# Patient Record
Sex: FEMALE | Race: BLACK OR AFRICAN AMERICAN | NOT HISPANIC OR LATINO | URBAN - METROPOLITAN AREA
[De-identification: names, ages, dates, MRNs, and addresses within clinical notes are randomized per-mention and may not be internally consistent; named-entity substitution may affect disease eponyms.]

---

## 2021-05-20 ENCOUNTER — EMERGENCY (EMERGENCY)
Facility: HOSPITAL | Age: 31
LOS: 0 days | Discharge: HOME | End: 2021-05-20
Payer: COMMERCIAL

## 2021-05-20 VITALS
HEIGHT: 69 IN | HEART RATE: 90 BPM | SYSTOLIC BLOOD PRESSURE: 110 MMHG | WEIGHT: 179.9 LBS | RESPIRATION RATE: 18 BRPM | OXYGEN SATURATION: 97 % | DIASTOLIC BLOOD PRESSURE: 73 MMHG | TEMPERATURE: 99 F

## 2021-05-20 DIAGNOSIS — M54.2 CERVICALGIA: ICD-10-CM

## 2021-05-20 DIAGNOSIS — Z04.1 ENCOUNTER FOR EXAMINATION AND OBSERVATION FOLLOWING TRANSPORT ACCIDENT: ICD-10-CM
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
## 2021-05-20 DIAGNOSIS — T14.90XA INJURY, UNSPECIFIED, INITIAL ENCOUNTER: ICD-10-CM

## 2021-05-20 PROCEDURE — 99283 EMERGENCY DEPT VISIT LOW MDM: CPT

## 2021-05-20 RX ORDER — CYCLOBENZAPRINE HYDROCHLORIDE 10 MG/1
1 TABLET, FILM COATED ORAL
Qty: 14 | Refills: 0
Start: 2021-05-20 | End: 2021-06-02
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
## 2021-05-20 NOTE — ED PROVIDER NOTE - PATIENT PORTAL LINK FT
You can access the FollowMyHealth Patient Portal offered by [facility] by registering at the following website: [email or web address]. By joining [employer]’s FollowMyHealth portal, you will also be able to view your health information using other applications (apps) compatible with our system.
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
## 2021-05-20 NOTE — ED PROVIDER NOTE - PHYSICAL EXAMINATION
GENERAL: NAD  SKIN: Warm and well perfused. No rashes, bruises, discolorations or abrasions.  HEAD: Atraumatic, normocephalic without edema, discoloration or evidence of trauma. Facial bones without deformities or tenderness.  EYES: PERRL. No scleral icterus or conjunctival injection. Extraocular muscles intact without nystagmus or diplopia. No proptosis or enophthalmos.  EARS: Normal appearing pinnae. No hemotympanum.  NOSE: No discharge, tenderness, laxity. No nasal septal hematoma.  MOUTH: No malocclusion or trismus. Moist mucus membranes without blood. Posterior pharynx without erythema or exudate.  NECK: Trachea midline. No discolorations or edema. no midline tenderness , FROM , supple.  CV: Regular rate and rhythm, Normal s1 and s2. No murmurs, rubs, or gallops.  PV: Radial pulses 2+ bilaterally and symmetric. Dorsalis pedis pulses 2+ bilaterally and symmetric. 2+ capillary refill. No extremity edema.  CHEST: No abrasions or ecchymosis. Chest symmetric with respirations. No chest wall tenderness. No crepitus. No step offs. Lungs are clear to auscultation bilaterally. No rales, rhonchi, wheezing or stridor.  ABDOMEN: No ecchymosis or abrasions. Soft, nondistended, nontender. Bowel tones normoactive. No masses or organomegaly.  BACK: No abrasions, skin openings, or ecchymosis. Spine without bony tenderness, no step offs.  PELVIC: Pelvis stable, nontender to lateral compression and palpation of symphysis pubis.  RECTAL: Normal tone. Stool without gross blood.  : Normal external genitalia without blood at meatus. No ecchymosis or edema.  MSK: No gross deformities or discolorations or lesions. Tolerates full range of motion of extremities without tenderness.  NEURO: Alert and oriented to person, place, and time. GCS 15. CN II-XII intact. Sensation grossly intact. Strength 5/5 in bilateral UE and LE. Finger to nose intact bilaterally.